# Patient Record
Sex: FEMALE | Race: WHITE | NOT HISPANIC OR LATINO
[De-identification: names, ages, dates, MRNs, and addresses within clinical notes are randomized per-mention and may not be internally consistent; named-entity substitution may affect disease eponyms.]

---

## 2020-06-25 ENCOUNTER — TRANSCRIPTION ENCOUNTER (OUTPATIENT)
Age: 30
End: 2020-06-25

## 2020-07-10 ENCOUNTER — TRANSCRIPTION ENCOUNTER (OUTPATIENT)
Age: 30
End: 2020-07-10

## 2020-08-23 ENCOUNTER — TRANSCRIPTION ENCOUNTER (OUTPATIENT)
Age: 30
End: 2020-08-23

## 2020-12-02 ENCOUNTER — TRANSCRIPTION ENCOUNTER (OUTPATIENT)
Age: 30
End: 2020-12-02

## 2020-12-17 ENCOUNTER — TRANSCRIPTION ENCOUNTER (OUTPATIENT)
Age: 30
End: 2020-12-17

## 2021-01-26 PROBLEM — Z00.00 ENCOUNTER FOR PREVENTIVE HEALTH EXAMINATION: Status: ACTIVE | Noted: 2021-01-26

## 2021-02-12 ENCOUNTER — APPOINTMENT (OUTPATIENT)
Dept: BREAST CENTER | Facility: CLINIC | Age: 31
End: 2021-02-12
Payer: COMMERCIAL

## 2021-02-12 VITALS
WEIGHT: 135 LBS | HEIGHT: 64 IN | SYSTOLIC BLOOD PRESSURE: 104 MMHG | HEART RATE: 63 BPM | BODY MASS INDEX: 23.05 KG/M2 | DIASTOLIC BLOOD PRESSURE: 72 MMHG

## 2021-02-12 DIAGNOSIS — F90.9 ATTENTION-DEFICIT HYPERACTIVITY DISORDER, UNSPECIFIED TYPE: ICD-10-CM

## 2021-02-12 DIAGNOSIS — Z86.59 PERSONAL HISTORY OF OTHER MENTAL AND BEHAVIORAL DISORDERS: ICD-10-CM

## 2021-02-12 DIAGNOSIS — Z78.9 OTHER SPECIFIED HEALTH STATUS: ICD-10-CM

## 2021-02-12 PROCEDURE — 99203 OFFICE O/P NEW LOW 30 MIN: CPT

## 2021-02-12 PROCEDURE — 99072 ADDL SUPL MATRL&STAF TM PHE: CPT

## 2021-02-12 RX ORDER — BUPROPION HYDROCHLORIDE 75 MG/1
TABLET, FILM COATED ORAL
Refills: 0 | Status: ACTIVE | COMMUNITY

## 2021-03-29 ENCOUNTER — NON-APPOINTMENT (OUTPATIENT)
Age: 31
End: 2021-03-29

## 2021-03-31 ENCOUNTER — TRANSCRIPTION ENCOUNTER (OUTPATIENT)
Age: 31
End: 2021-03-31

## 2021-08-11 ENCOUNTER — APPOINTMENT (OUTPATIENT)
Dept: BREAST CENTER | Facility: CLINIC | Age: 31
End: 2021-08-11

## 2021-08-17 ENCOUNTER — TRANSCRIPTION ENCOUNTER (OUTPATIENT)
Age: 31
End: 2021-08-17

## 2021-11-23 ENCOUNTER — TRANSCRIPTION ENCOUNTER (OUTPATIENT)
Age: 31
End: 2021-11-23

## 2022-03-03 ENCOUNTER — NON-APPOINTMENT (OUTPATIENT)
Age: 32
End: 2022-03-03

## 2022-03-10 ENCOUNTER — APPOINTMENT (OUTPATIENT)
Dept: BREAST CENTER | Facility: CLINIC | Age: 32
End: 2022-03-10
Payer: COMMERCIAL

## 2022-03-18 ENCOUNTER — APPOINTMENT (OUTPATIENT)
Dept: BREAST CENTER | Facility: CLINIC | Age: 32
End: 2022-03-18
Payer: COMMERCIAL

## 2022-03-18 VITALS
WEIGHT: 135 LBS | DIASTOLIC BLOOD PRESSURE: 72 MMHG | BODY MASS INDEX: 23.05 KG/M2 | HEART RATE: 80 BPM | HEIGHT: 64 IN | SYSTOLIC BLOOD PRESSURE: 112 MMHG

## 2022-03-18 PROCEDURE — 99212 OFFICE O/P EST SF 10 MIN: CPT

## 2022-07-20 ENCOUNTER — TRANSCRIPTION ENCOUNTER (OUTPATIENT)
Age: 32
End: 2022-07-20

## 2023-02-13 ENCOUNTER — NON-APPOINTMENT (OUTPATIENT)
Age: 33
End: 2023-02-13

## 2023-02-13 ENCOUNTER — APPOINTMENT (OUTPATIENT)
Dept: BREAST CENTER | Facility: CLINIC | Age: 33
End: 2023-02-13
Payer: COMMERCIAL

## 2023-02-13 VITALS
WEIGHT: 164 LBS | DIASTOLIC BLOOD PRESSURE: 73 MMHG | HEIGHT: 64 IN | SYSTOLIC BLOOD PRESSURE: 116 MMHG | BODY MASS INDEX: 28 KG/M2 | HEART RATE: 83 BPM

## 2023-02-13 DIAGNOSIS — G11.3 CEREBELLAR ATAXIA WITH DEFECTIVE DNA REPAIR: ICD-10-CM

## 2023-02-13 PROCEDURE — 99214 OFFICE O/P EST MOD 30 MIN: CPT

## 2023-02-13 RX ORDER — CETIRIZINE HCL 10 MG
TABLET ORAL
Refills: 0 | Status: DISCONTINUED | COMMUNITY
End: 2023-02-13

## 2023-02-13 NOTE — PHYSICAL EXAM
[de-identified] : Bilateral breast/axilla/supraclavicular area: No masses, discharge, or adenopathy\par

## 2023-02-13 NOTE — HISTORY OF PRESENT ILLNESS
[FreeTextEntry1] : Patient is a 33 yo  F here for breast cancer screening. 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 Family history of mother w/ CASSIE gene (no cancer) and maternal grandmother w/ breast cancer (age 35, no genetic testing). Patient denies palpable masses, skin changes, or nipple discharge bilaterally.\par \par 2/13/23-[facility]GAP\par \par 5/14/21: MRI- no suspicious enhancement b/l- BI RADS 1 \par 7/22/22 MRI: LIDIA, BIRADS 1

## 2023-02-13 NOTE — REVIEW OF SYSTEMS
[Fever] : no fever [Chills] : no chills [Shortness Of Breath] : no shortness of breath [Cough] : no cough [Skin Lesions] : no skin lesions [Skin Wound] : no skin wound [Difficulty Walking] : no difficulty walking

## 2023-06-02 ENCOUNTER — NON-APPOINTMENT (OUTPATIENT)
Age: 33
End: 2023-06-02

## 2023-06-02 ENCOUNTER — APPOINTMENT (OUTPATIENT)
Dept: OBGYN | Facility: CLINIC | Age: 33
End: 2023-06-02
Payer: COMMERCIAL

## 2023-06-02 VITALS
BODY MASS INDEX: 26.29 KG/M2 | SYSTOLIC BLOOD PRESSURE: 124 MMHG | DIASTOLIC BLOOD PRESSURE: 75 MMHG | HEIGHT: 64 IN | WEIGHT: 154 LBS | HEART RATE: 80 BPM

## 2023-06-02 DIAGNOSIS — Z31.41 ENCOUNTER FOR FERTILITY TESTING: ICD-10-CM

## 2023-06-02 DIAGNOSIS — Z31.69 ENCOUNTER FOR OTHER GENERAL COUNSELING AND ADVICE ON PROCREATION: ICD-10-CM

## 2023-06-02 DIAGNOSIS — Z11.3 ENCOUNTER FOR SCREENING FOR INFECTIONS WITH A PREDOMINANTLY SEXUAL MODE OF TRANSMISSION: ICD-10-CM

## 2023-06-02 DIAGNOSIS — Z12.39 ENCOUNTER FOR OTHER SCREENING FOR MALIGNANT NEOPLASM OF BREAST: ICD-10-CM

## 2023-06-02 DIAGNOSIS — Z01.419 ENCOUNTER FOR GYNECOLOGICAL EXAMINATION (GENERAL) (ROUTINE) W/OUT ABNORMAL FINDINGS: ICD-10-CM

## 2023-06-02 DIAGNOSIS — Z12.4 ENCOUNTER FOR SCREENING FOR MALIGNANT NEOPLASM OF CERVIX: ICD-10-CM

## 2023-06-02 PROCEDURE — 99385 PREV VISIT NEW AGE 18-39: CPT

## 2023-06-02 PROCEDURE — 36415 COLL VENOUS BLD VENIPUNCTURE: CPT

## 2023-06-03 ENCOUNTER — TRANSCRIPTION ENCOUNTER (OUTPATIENT)
Age: 33
End: 2023-06-03

## 2023-06-03 LAB
25(OH)D3 SERPL-MCNC: 20.3 NG/ML
ALBUMIN SERPL ELPH-MCNC: 4.9 G/DL
ALP BLD-CCNC: 66 U/L
ALT SERPL-CCNC: 14 U/L
ANION GAP SERPL CALC-SCNC: 13 MMOL/L
ANTI-MUELLERIAN HORMONE: 5.91 NG/ML
AST SERPL-CCNC: 22 U/L
BILIRUB SERPL-MCNC: 0.9 MG/DL
BUN SERPL-MCNC: 20 MG/DL
C TRACH RRNA SPEC QL NAA+PROBE: NOT DETECTED
CALCIUM SERPL-MCNC: 9.5 MG/DL
CHLORIDE SERPL-SCNC: 104 MMOL/L
CHOLEST SERPL-MCNC: 135 MG/DL
CO2 SERPL-SCNC: 28 MMOL/L
CREAT SERPL-MCNC: 0.8 MG/DL
EGFR: 101 ML/MIN/1.73M2
ESTIMATED AVERAGE GLUCOSE: 97 MG/DL
GLUCOSE SERPL-MCNC: 90 MG/DL
HBA1C MFR BLD HPLC: 5 %
HBV SURFACE AG SER QL: NONREACTIVE
HCV AB SER QL: NONREACTIVE
HCV S/CO RATIO: 0.11 S/CO
HDLC SERPL-MCNC: 83 MG/DL
HIV1+2 AB SPEC QL IA.RAPID: NONREACTIVE
HPV HIGH+LOW RISK DNA PNL CVX: NOT DETECTED
LDLC SERPL CALC-MCNC: 47 MG/DL
N GONORRHOEA RRNA SPEC QL NAA+PROBE: NOT DETECTED
NONHDLC SERPL-MCNC: 52 MG/DL
POTASSIUM SERPL-SCNC: 4.4 MMOL/L
PROT SERPL-MCNC: 7.1 G/DL
SODIUM SERPL-SCNC: 145 MMOL/L
SOURCE TP AMPLIFICATION: NORMAL
T PALLIDUM AB SER QL IA: NEGATIVE
TRIGL SERPL-MCNC: 28 MG/DL
TSH SERPL-ACNC: 2.1 UIU/ML
VIT B12 SERPL-MCNC: 570 PG/ML

## 2023-06-03 NOTE — PLAN
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 Ananda Gonzalez.  She has f/u appt scheduled 9/2023 and will discuss further what she has been told about having baseline mammogram prior to pregnancy.\par Pap smear and HPV testing done\par GC/CT cxs via pap done\par STD blood work panel drawn\par Annual screening blood work drawn \par Breast exam done and WNL\par Age-related screening tests and timing discussed.\par

## 2023-06-03 NOTE — HISTORY OF PRESENT ILLNESS
[IUD] : has an intrauterine device [Y] : Patient is sexually active [FreeTextEntry1] : 32 y/o G0 here to re-establish GYN care \par Pt in prior practice.\par Here today for annual exam and for preconception counseling.\par Wants to try for pregnancy early  \par Has Mirena IUD that was placed 2018\par ?? PCOS diagnosis\par \par \par \par \par ------------------------------------------------------------------------------------------------------------------------------------------\par PMH: ADHD\par \par PSH: Denies \par \par OB Hx: G0, never TTC\par \par GYN Hx: \par Last pap 1 year ago, normal per pt, h/o HPV for a few years, no colposcopy\par Menarche @ 14 y/o, some irregular cycles, last 5 days\par No STDs other than HPV\par Denies h/o uterine fibroids \par Has h/o ovarian cyst, followed and resolved, no surgery\par ??? h/o PCOS\par Has 2018, Mirena IUD\par \par Family Hx:\par Mat GM - breast cancer, dx 34 y/o\par Mother has ATN gene - no cancer\par Pt has ATN gene - gets screening \par Father - angiosarcoma  after 3 months of diagnosis, dx 64 y/o\par \par Meds: Wellbutrin 300 mg \par \par Allergies: NKDA\par \par Social Hx:\par Never a smoker\par Social alcohol use\par No drug use\par  - LISA GIVENS - 30 y/o, healthy, no children \par Been  1 year, been together 6 years, met through mutual friends\par Works - in executive search for [employer] \par \par  [PapSmeardate] : 06/01/20 [LMPDate] : 02/01/2018

## 2023-06-03 NOTE — COUNSELING
[Vitamins/Supplements] : vitamins/supplements [Drugs/Alcohol] : drugs, alcohol [Breast Self Exam] : breast self exam [Contraception/ Emergency Contraception/ Safe Sexual Practices] : contraception, emergency contraception, safe sexual practices [Preconception Care/ Fertility options] : preconception care, fertility options [Confidentiality] : confidentiality [STD (testing, results, tx)] : STD (testing, results, tx) [Vaccines] : vaccines

## 2023-06-03 NOTE — PHYSICAL EXAM
[Chaperone Present] : A chaperone was present in the examining room during all aspects of the physical examination [Appropriately responsive] : appropriately responsive [Alert] : alert [No Acute Distress] : no acute distress [No Lymphadenopathy] : no lymphadenopathy [Soft] : soft [Non-tender] : non-tender [Non-distended] : non-distended [No HSM] : No HSM [No Lesions] : no lesions [No Mass] : no mass [Oriented x3] : oriented x3 [Examination Of The Breasts] : a normal appearance [No Masses] : no breast masses were palpable [Labia Majora] : normal [Labia Minora] : normal [Normal] : normal [Uterine Adnexae] : normal [FreeTextEntry5] : No increased work in breathing

## 2023-06-10 LAB — CYTOLOGY CVX/VAG DOC THIN PREP: NORMAL

## 2023-07-11 ENCOUNTER — APPOINTMENT (OUTPATIENT)
Dept: INTERNAL MEDICINE | Facility: CLINIC | Age: 33
End: 2023-07-11

## 2023-08-16 ENCOUNTER — APPOINTMENT (OUTPATIENT)
Dept: INTERNAL MEDICINE | Facility: CLINIC | Age: 33
End: 2023-08-16

## 2023-09-05 ENCOUNTER — NON-APPOINTMENT (OUTPATIENT)
Age: 33
End: 2023-09-05

## 2023-09-06 ENCOUNTER — APPOINTMENT (OUTPATIENT)
Dept: BREAST CENTER | Facility: CLINIC | Age: 33
End: 2023-09-06
Payer: COMMERCIAL

## 2023-09-06 VITALS
HEIGHT: 64 IN | BODY MASS INDEX: 27.66 KG/M2 | SYSTOLIC BLOOD PRESSURE: 104 MMHG | DIASTOLIC BLOOD PRESSURE: 72 MMHG | WEIGHT: 162 LBS | HEART RATE: 69 BPM

## 2023-09-06 DIAGNOSIS — Z80.3 FAMILY HISTORY OF MALIGNANT NEOPLASM OF BREAST: ICD-10-CM

## 2023-09-06 DIAGNOSIS — Z91.89 OTHER SPECIFIED PERSONAL RISK FACTORS, NOT ELSEWHERE CLASSIFIED: ICD-10-CM

## 2023-09-06 PROCEDURE — 99214 OFFICE O/P EST MOD 30 MIN: CPT

## 2023-09-06 NOTE — PHYSICAL EXAM
[de-identified] : Bilateral breast/axilla/supraclavicular area: No masses, discharge, or adenopathy\par

## 2023-09-06 NOTE — HISTORY OF PRESENT ILLNESS
[FreeTextEntry1] : Patient is a 34 yo  F here for breast cancer screening. 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 Family history of mother w/ CASSIE gene (no cancer) and maternal grandmother w/ breast cancer (age 35, no genetic testing). Patient denies palpable masses, skin changes, or nipple discharge bilaterally.   2/13/23: [facility] Registry  5/14/21: MRI- no suspicious enhancement b/l- BI RADS 1  7/22/22: MRI- LIDIA, BIRADS 1 8/31/23: MRI- No MR evidence of malignancy. BI-RADS 1

## 2023-10-04 ENCOUNTER — APPOINTMENT (OUTPATIENT)
Dept: OBGYN | Facility: CLINIC | Age: 33
End: 2023-10-04
Payer: COMMERCIAL

## 2023-10-04 VITALS
SYSTOLIC BLOOD PRESSURE: 125 MMHG | WEIGHT: 155 LBS | DIASTOLIC BLOOD PRESSURE: 80 MMHG | HEIGHT: 64 IN | HEART RATE: 71 BPM | OXYGEN SATURATION: 98 % | BODY MASS INDEX: 26.46 KG/M2

## 2023-10-04 DIAGNOSIS — Z30.432 ENCOUNTER FOR REMOVAL OF INTRAUTERINE CONTRACEPTIVE DEVICE: ICD-10-CM

## 2023-10-04 DIAGNOSIS — Z31.9 ENCOUNTER FOR PROCREATIVE MANAGEMENT, UNSPECIFIED: ICD-10-CM

## 2023-10-04 PROCEDURE — 58301 REMOVE INTRAUTERINE DEVICE: CPT

## 2023-10-05 ENCOUNTER — TRANSCRIPTION ENCOUNTER (OUTPATIENT)
Age: 33
End: 2023-10-05

## 2023-10-25 ENCOUNTER — TRANSCRIPTION ENCOUNTER (OUTPATIENT)
Age: 33
End: 2023-10-25

## 2023-10-26 ENCOUNTER — TRANSCRIPTION ENCOUNTER (OUTPATIENT)
Age: 33
End: 2023-10-26

## 2023-11-13 PROBLEM — Z30.432 ENCOUNTER FOR REMOVAL OF INTRAUTERINE CONTRACEPTIVE DEVICE (IUD): Status: ACTIVE | Noted: 2023-11-13

## 2023-11-13 PROBLEM — Z31.9 PATIENT DESIRES PREGNANCY: Status: ACTIVE | Noted: 2023-11-13

## 2024-05-16 ENCOUNTER — TRANSCRIPTION ENCOUNTER (OUTPATIENT)
Age: 34
End: 2024-05-16

## 2024-05-16 DIAGNOSIS — Z15.01 GENETIC SUSCEPTIBILITY TO MALIGNANT NEOPLASM OF BREAST: ICD-10-CM

## 2024-05-16 DIAGNOSIS — Z80.3 FAMILY HISTORY OF MALIGNANT NEOPLASM OF BREAST: ICD-10-CM

## 2024-05-16 DIAGNOSIS — Z15.09 GENETIC SUSCEPTIBILITY TO MALIGNANT NEOPLASM OF BREAST: ICD-10-CM

## 2024-05-21 ENCOUNTER — TRANSCRIPTION ENCOUNTER (OUTPATIENT)
Age: 34
End: 2024-05-21

## 2024-09-11 ENCOUNTER — NON-APPOINTMENT (OUTPATIENT)
Age: 34
End: 2024-09-11

## 2024-09-11 ENCOUNTER — TRANSCRIPTION ENCOUNTER (OUTPATIENT)
Age: 34
End: 2024-09-11

## 2024-09-16 ENCOUNTER — APPOINTMENT (OUTPATIENT)
Dept: BREAST CENTER | Facility: CLINIC | Age: 34
End: 2024-09-16
Payer: COMMERCIAL

## 2024-09-16 VITALS — HEIGHT: 64 IN | SYSTOLIC BLOOD PRESSURE: 119 MMHG | HEART RATE: 64 BPM | DIASTOLIC BLOOD PRESSURE: 81 MMHG

## 2024-09-16 DIAGNOSIS — Z15.09 GENETIC SUSCEPTIBILITY TO MALIGNANT NEOPLASM OF BREAST: ICD-10-CM

## 2024-09-16 DIAGNOSIS — R92.30 DENSE BREASTS, UNSPECIFIED: ICD-10-CM

## 2024-09-16 DIAGNOSIS — Z15.01 GENETIC SUSCEPTIBILITY TO MALIGNANT NEOPLASM OF BREAST: ICD-10-CM

## 2024-09-16 DIAGNOSIS — Z80.3 FAMILY HISTORY OF MALIGNANT NEOPLASM OF BREAST: ICD-10-CM

## 2024-09-16 DIAGNOSIS — Z91.89 OTHER SPECIFIED PERSONAL RISK FACTORS, NOT ELSEWHERE CLASSIFIED: ICD-10-CM

## 2024-09-16 PROCEDURE — 99213 OFFICE O/P EST LOW 20 MIN: CPT

## 2024-09-16 RX ORDER — LISDEXAMFETAMINE DIMESYLATE 10 MG/1
CAPSULE ORAL
Refills: 0 | Status: ACTIVE | COMMUNITY

## 2024-09-16 NOTE — PAST MEDICAL HISTORY
[Menarche Age ____] : age at menarche was [unfilled] [Definite ___ (Date)] : the last menstrual period was [unfilled] [Total Preg ___] : G[unfilled] [FreeTextEntry5] : no [FreeTextEntry6] : no [FreeTextEntry7] : Mirena iud  [FreeTextEntry8] : no

## 2024-09-16 NOTE — PHYSICAL EXAM
[Normocephalic] : normocephalic [EOMI] : extra ocular movement intact [Supple] : supple [No Supraclavicular Adenopathy] : no supraclavicular adenopathy [No Cervical Adenopathy] : no cervical adenopathy [de-identified] : Bilateral breast/axilla/supraclavicular area: No masses, discharge, or adenopathy\par

## 2024-09-16 NOTE — HISTORY OF PRESENT ILLNESS
[FreeTextEntry1] : Patient is a 33 yo  F here for breast cancer screening. 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 Family history of mother w/ CASSIE gene (no cancer) and maternal grandmother w/ breast cancer (age 35, no genetic testing). Patient denies palpable masses, skin changes, or nipple discharge bilaterally.  Of note, patient states she is trying to get pregnant.   2/13/23: [facility] Registry  5/14/21: MRI- no suspicious enhancement b/l- BI RADS 1  7/22/22: MRI- LIDIA, BIRADS 1 8/31/23: MRI- No MR evidence of malignancy. BI-RADS 1 5/24/24: B/l MG(baseline): Heterogeneously dense.  LIDIA.  BI-RADS 1. 9/16/24: MRI-heterogeneously dense.  LIDIA.  BI-RADS 2. 9/16/24: B/l US(baseline)-no solid or complex cystic mass bilaterally.  No lymphadenopathy bilaterally.  BI-RADS 2.

## 2024-09-16 NOTE — PHYSICAL EXAM
[Normocephalic] : normocephalic [EOMI] : extra ocular movement intact [Supple] : supple [No Supraclavicular Adenopathy] : no supraclavicular adenopathy [No Cervical Adenopathy] : no cervical adenopathy [de-identified] : Bilateral breast/axilla/supraclavicular area: No masses, discharge, or adenopathy\par

## 2024-09-16 NOTE — PHYSICAL EXAM
[Normocephalic] : normocephalic [EOMI] : extra ocular movement intact [Supple] : supple [No Supraclavicular Adenopathy] : no supraclavicular adenopathy [No Cervical Adenopathy] : no cervical adenopathy [de-identified] : Bilateral breast/axilla/supraclavicular area: No masses, discharge, or adenopathy\par

## 2024-09-16 NOTE — PHYSICAL EXAM
[Normocephalic] : normocephalic [EOMI] : extra ocular movement intact [Supple] : supple [No Supraclavicular Adenopathy] : no supraclavicular adenopathy [No Cervical Adenopathy] : no cervical adenopathy [de-identified] : Bilateral breast/axilla/supraclavicular area: No masses, discharge, or adenopathy\par

## 2024-09-16 NOTE — HISTORY OF PRESENT ILLNESS
[FreeTextEntry1] : Patient is a 35 yo  F here for breast cancer screening. 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 Family history of mother w/ CASSIE gene (no cancer) and maternal grandmother w/ breast cancer (age 35, no genetic testing). Patient denies palpable masses, skin changes, or nipple discharge bilaterally.  Of note, patient states she is trying to get pregnant.   2/13/23: [facility] Registry  5/14/21: MRI- no suspicious enhancement b/l- BI RADS 1  7/22/22: MRI- LIDIA, BIRADS 1 8/31/23: MRI- No MR evidence of malignancy. BI-RADS 1 5/24/24: B/l MG(baseline): Heterogeneously dense.  LIDIA.  BI-RADS 1. 9/16/24: MRI-heterogeneously dense.  LIDIA.  BI-RADS 2. 9/16/24: B/l US(baseline)-no solid or complex cystic mass bilaterally.  No lymphadenopathy bilaterally.  BI-RADS 2.

## 2024-10-11 ENCOUNTER — NON-APPOINTMENT (OUTPATIENT)
Age: 34
End: 2024-10-11

## 2024-10-11 ENCOUNTER — APPOINTMENT (OUTPATIENT)
Dept: OBGYN | Facility: CLINIC | Age: 34
End: 2024-10-11

## 2024-10-11 VITALS
WEIGHT: 156 LBS | OXYGEN SATURATION: 97 % | BODY MASS INDEX: 26.78 KG/M2 | SYSTOLIC BLOOD PRESSURE: 118 MMHG | HEART RATE: 77 BPM | DIASTOLIC BLOOD PRESSURE: 79 MMHG

## 2024-10-11 DIAGNOSIS — Z31.69 ENCOUNTER FOR OTHER GENERAL COUNSELING AND ADVICE ON PROCREATION: ICD-10-CM

## 2024-10-11 DIAGNOSIS — Z12.4 ENCOUNTER FOR SCREENING FOR MALIGNANT NEOPLASM OF CERVIX: ICD-10-CM

## 2024-10-11 DIAGNOSIS — Z01.419 ENCOUNTER FOR GYNECOLOGICAL EXAMINATION (GENERAL) (ROUTINE) W/OUT ABNORMAL FINDINGS: ICD-10-CM

## 2024-10-11 DIAGNOSIS — Z12.39 ENCOUNTER FOR OTHER SCREENING FOR MALIGNANT NEOPLASM OF BREAST: ICD-10-CM

## 2024-10-11 DIAGNOSIS — Z11.3 ENCOUNTER FOR SCREENING FOR INFECTIONS WITH A PREDOMINANTLY SEXUAL MODE OF TRANSMISSION: ICD-10-CM

## 2024-10-11 PROCEDURE — 99395 PREV VISIT EST AGE 18-39: CPT

## 2024-10-14 LAB
C TRACH RRNA SPEC QL NAA+PROBE: NOT DETECTED
HPV HIGH+LOW RISK DNA PNL CVX: NOT DETECTED
N GONORRHOEA RRNA SPEC QL NAA+PROBE: NOT DETECTED
SOURCE TP AMPLIFICATION: NORMAL

## 2024-10-17 LAB — CYTOLOGY CVX/VAG DOC THIN PREP: NORMAL

## 2024-10-21 ENCOUNTER — TRANSCRIPTION ENCOUNTER (OUTPATIENT)
Age: 34
End: 2024-10-21

## 2024-11-01 ENCOUNTER — TRANSCRIPTION ENCOUNTER (OUTPATIENT)
Age: 34
End: 2024-11-01

## 2024-11-05 ENCOUNTER — APPOINTMENT (OUTPATIENT)
Dept: OBGYN | Facility: CLINIC | Age: 34
End: 2024-11-05

## 2024-11-05 ENCOUNTER — LABORATORY RESULT (OUTPATIENT)
Age: 34
End: 2024-11-05

## 2024-11-05 VITALS
WEIGHT: 158 LBS | OXYGEN SATURATION: 98 % | HEART RATE: 99 BPM | BODY MASS INDEX: 27.12 KG/M2 | SYSTOLIC BLOOD PRESSURE: 100 MMHG | DIASTOLIC BLOOD PRESSURE: 65 MMHG

## 2024-11-05 DIAGNOSIS — N91.1 SECONDARY AMENORRHEA: ICD-10-CM

## 2024-11-05 DIAGNOSIS — Z32.00 ENCOUNTER FOR PREGNANCY TEST, RESULT UNKNOWN: ICD-10-CM

## 2024-11-05 PROCEDURE — 36415 COLL VENOUS BLD VENIPUNCTURE: CPT

## 2024-11-05 PROCEDURE — 81025 URINE PREGNANCY TEST: CPT

## 2024-11-05 PROCEDURE — 76830 TRANSVAGINAL US NON-OB: CPT

## 2024-11-05 PROCEDURE — 99214 OFFICE O/P EST MOD 30 MIN: CPT | Mod: 25

## 2024-11-06 LAB
ABO + RH PNL BLD: NORMAL
ALBUMIN SERPL ELPH-MCNC: 4.6 G/DL
ALP BLD-CCNC: 49 U/L
ALT SERPL-CCNC: 24 U/L
ANION GAP SERPL CALC-SCNC: 13 MMOL/L
AST SERPL-CCNC: 39 U/L
B19V IGG SER QL IA: 0.22 INDEX
B19V IGG+IGM SER-IMP: NEGATIVE
B19V IGG+IGM SER-IMP: NORMAL
B19V IGM FLD-ACNC: 0.26 INDEX
B19V IGM SER-ACNC: NEGATIVE
BILIRUB SERPL-MCNC: 0.8 MG/DL
BLD GP AB SCN SERPL QL: NORMAL
BUN SERPL-MCNC: 19 MG/DL
C TRACH RRNA SPEC QL NAA+PROBE: NOT DETECTED
CALCIUM SERPL-MCNC: 9.4 MG/DL
CHLORIDE SERPL-SCNC: 103 MMOL/L
CMV IGG SERPL QL: <0.2 U/ML
CMV IGG SERPL-IMP: NEGATIVE
CMV IGM SERPL QL: <8 AU/ML
CMV IGM SERPL QL: NEGATIVE
CO2 SERPL-SCNC: 25 MMOL/L
CREAT SERPL-MCNC: 0.67 MG/DL
EGFR: 119 ML/MIN/1.73M2
ESTIMATED AVERAGE GLUCOSE: 97 MG/DL
GLUCOSE SERPL-MCNC: 94 MG/DL
HBA1C MFR BLD HPLC: 5 %
HBV SURFACE AG SER QL: NONREACTIVE
HCT VFR BLD CALC: 40.6 %
HCV AB SER QL: NONREACTIVE
HCV RNA SERPL NAA+PROBE-LOG IU: NOT DETECTED LOGIU/ML
HCV S/CO RATIO: 0.09 S/CO
HEPC RNA INTERP: NOT DETECTED
HGB A MFR BLD: 97.5 %
HGB A2 MFR BLD: 2.5 %
HGB BLD-MCNC: 13.3 G/DL
HGB FRACT BLD-IMP: NORMAL
HIV1+2 AB SPEC QL IA.RAPID: NONREACTIVE
MCHC RBC-ENTMCNC: 32.5 PG
MCHC RBC-ENTMCNC: 32.8 G/DL
MCV RBC AUTO: 99.3 FL
MEV IGG FLD QL IA: >300 AU/ML
MEV IGG+IGM SER-IMP: POSITIVE
MUV AB SER-ACNC: POSITIVE
MUV IGG SER QL IA: 105 AU/ML
N GONORRHOEA RRNA SPEC QL NAA+PROBE: NOT DETECTED
PLATELET # BLD AUTO: 311 K/UL
POTASSIUM SERPL-SCNC: 4.7 MMOL/L
PROT SERPL-MCNC: 7 G/DL
RBC # BLD: 4.09 M/UL
RBC # FLD: 13.2 %
RUBV IGG FLD-ACNC: 2.17 INDEX
RUBV IGG SER-IMP: POSITIVE
SODIUM SERPL-SCNC: 141 MMOL/L
SOURCE AMPLIFICATION: NORMAL
T GONDII AB SER-IMP: NEGATIVE
T GONDII AB SER-IMP: NEGATIVE
T GONDII IGG SER QL: <3 IU/ML
T GONDII IGM SER QL: <3 AU/ML
T PALLIDUM AB SER QL IA: NEGATIVE
TSH SERPL-ACNC: 3.07 UIU/ML
VZV AB TITR SER: POSITIVE
VZV IGG SER IF-ACNC: 4.56 S/CO
WBC # FLD AUTO: 5.36 K/UL

## 2024-11-07 LAB — BACTERIA UR CULT: NORMAL

## 2024-11-19 ENCOUNTER — APPOINTMENT (OUTPATIENT)
Dept: OBGYN | Facility: CLINIC | Age: 34
End: 2024-11-19

## 2024-11-19 VITALS
OXYGEN SATURATION: 99 % | WEIGHT: 159 LBS | DIASTOLIC BLOOD PRESSURE: 66 MMHG | BODY MASS INDEX: 27.14 KG/M2 | HEART RATE: 79 BPM | SYSTOLIC BLOOD PRESSURE: 108 MMHG | HEIGHT: 64 IN

## 2024-11-19 PROCEDURE — 81003 URINALYSIS AUTO W/O SCOPE: CPT | Mod: QW

## 2024-11-19 PROCEDURE — 0500F INITIAL PRENATAL CARE VISIT: CPT

## 2024-11-19 PROCEDURE — 76830 TRANSVAGINAL US NON-OB: CPT

## 2024-12-02 ENCOUNTER — TRANSCRIPTION ENCOUNTER (OUTPATIENT)
Age: 34
End: 2024-12-02

## 2024-12-03 ENCOUNTER — NON-APPOINTMENT (OUTPATIENT)
Age: 34
End: 2024-12-03

## 2024-12-06 ENCOUNTER — APPOINTMENT (OUTPATIENT)
Dept: OBGYN | Facility: CLINIC | Age: 34
End: 2024-12-06

## 2024-12-06 PROCEDURE — 0500F INITIAL PRENATAL CARE VISIT: CPT

## 2024-12-06 PROCEDURE — 81003 URINALYSIS AUTO W/O SCOPE: CPT | Mod: NC,QW

## 2024-12-06 PROCEDURE — 36415 COLL VENOUS BLD VENIPUNCTURE: CPT

## 2024-12-18 ENCOUNTER — ASOB RESULT (OUTPATIENT)
Age: 34
End: 2024-12-18

## 2024-12-18 ENCOUNTER — APPOINTMENT (OUTPATIENT)
Dept: ANTEPARTUM | Facility: CLINIC | Age: 34
End: 2024-12-18
Payer: COMMERCIAL

## 2024-12-18 PROCEDURE — 76813 OB US NUCHAL MEAS 1 GEST: CPT

## 2024-12-18 PROCEDURE — 93976 VASCULAR STUDY: CPT

## 2024-12-18 PROCEDURE — 76801 OB US < 14 WKS SINGLE FETUS: CPT | Mod: 59

## 2025-01-03 ENCOUNTER — TRANSCRIPTION ENCOUNTER (OUTPATIENT)
Age: 35
End: 2025-01-03

## 2025-01-03 PROBLEM — Z13.79 GENETIC TESTING: Status: ACTIVE | Noted: 2025-01-03

## 2025-01-03 PROBLEM — Z34.91 FIRST TRIMESTER PREGNANCY: Status: ACTIVE | Noted: 2025-01-03

## 2025-01-13 ENCOUNTER — APPOINTMENT (OUTPATIENT)
Dept: OBGYN | Facility: CLINIC | Age: 35
End: 2025-01-13
Payer: COMMERCIAL

## 2025-01-13 PROCEDURE — 0502F SUBSEQUENT PRENATAL CARE: CPT

## 2025-01-27 ENCOUNTER — NON-APPOINTMENT (OUTPATIENT)
Age: 35
End: 2025-01-27

## 2025-01-27 ENCOUNTER — APPOINTMENT (OUTPATIENT)
Dept: OBGYN | Facility: CLINIC | Age: 35
End: 2025-01-27
Payer: COMMERCIAL

## 2025-01-27 VITALS
SYSTOLIC BLOOD PRESSURE: 119 MMHG | HEART RATE: 77 BPM | WEIGHT: 169 LBS | OXYGEN SATURATION: 98 % | BODY MASS INDEX: 29.01 KG/M2 | DIASTOLIC BLOOD PRESSURE: 79 MMHG

## 2025-01-27 DIAGNOSIS — Z34.90 ENCOUNTER FOR SUPERVISION OF NORMAL PREGNANCY, UNSPECIFIED, UNSPECIFIED TRIMESTER: ICD-10-CM

## 2025-01-27 PROCEDURE — 0502F SUBSEQUENT PRENATAL CARE: CPT

## 2025-01-28 LAB
AFP INTERP SERPL-IMP: NORMAL
AFP INTERP SERPL-IMP: NORMAL
AFP MOM CUT-OFF: 2.5
AFP MOM SERPL: 0.66
AFP PERCENTILE: 9.5
AFP SERPL-ACNC: 26.06 NG/ML
CARBAMAZEPINE?: NO
CURRENT SMOKER: NORMAL
DIABETES STATUS PATIENT: NORMAL
GA: NORMAL
GESTATIONAL AGE METHOD: NORMAL
HX OF NTD NARR: NORMAL
MULTIPLE PREGNANCY: NORMAL
NEURAL TUBE DEFECT RISK FETUS: NORMAL
NEURAL TUBE DEFECT RISK POP: NORMAL
RECOM F/U: NO
TEST PERFORMANCE INFO SPEC: NORMAL
VALPROIC ACID?: NORMAL

## 2025-02-11 ENCOUNTER — APPOINTMENT (OUTPATIENT)
Dept: OBGYN | Facility: CLINIC | Age: 35
End: 2025-02-11
Payer: COMMERCIAL

## 2025-02-11 VITALS
SYSTOLIC BLOOD PRESSURE: 115 MMHG | WEIGHT: 177 LBS | BODY MASS INDEX: 30.38 KG/M2 | DIASTOLIC BLOOD PRESSURE: 72 MMHG | HEART RATE: 83 BPM | OXYGEN SATURATION: 98 %

## 2025-02-11 PROCEDURE — 0502F SUBSEQUENT PRENATAL CARE: CPT

## 2025-02-14 ENCOUNTER — TRANSCRIPTION ENCOUNTER (OUTPATIENT)
Age: 35
End: 2025-02-14

## 2025-02-25 ENCOUNTER — APPOINTMENT (OUTPATIENT)
Dept: ANTEPARTUM | Facility: CLINIC | Age: 35
End: 2025-02-25
Payer: COMMERCIAL

## 2025-02-25 ENCOUNTER — ASOB RESULT (OUTPATIENT)
Age: 35
End: 2025-02-25

## 2025-02-25 PROCEDURE — 76811 OB US DETAILED SNGL FETUS: CPT

## 2025-02-25 PROCEDURE — 76817 TRANSVAGINAL US OBSTETRIC: CPT

## 2025-03-17 ENCOUNTER — APPOINTMENT (OUTPATIENT)
Dept: OBGYN | Facility: CLINIC | Age: 35
End: 2025-03-17
Payer: COMMERCIAL

## 2025-03-17 VITALS
SYSTOLIC BLOOD PRESSURE: 115 MMHG | HEIGHT: 64 IN | HEART RATE: 81 BPM | OXYGEN SATURATION: 98 % | BODY MASS INDEX: 30.39 KG/M2 | DIASTOLIC BLOOD PRESSURE: 73 MMHG | WEIGHT: 178 LBS

## 2025-03-17 DIAGNOSIS — Z34.02 ENCOUNTER FOR SUPERVISION OF NORMAL FIRST PREGNANCY, SECOND TRIMESTER: ICD-10-CM

## 2025-03-17 DIAGNOSIS — Z13.1 ENCOUNTER FOR SCREENING FOR DIABETES MELLITUS: ICD-10-CM

## 2025-03-17 PROCEDURE — 0502F SUBSEQUENT PRENATAL CARE: CPT

## 2025-03-19 ENCOUNTER — TRANSCRIPTION ENCOUNTER (OUTPATIENT)
Age: 35
End: 2025-03-19

## 2025-03-19 LAB
GLUCOSE 1H P 50 G GLC PO SERPL-MCNC: 124 MG/DL
HCT VFR BLD CALC: 37.5 %
HGB BLD-MCNC: 12.3 G/DL
MCHC RBC-ENTMCNC: 32.7 PG
MCHC RBC-ENTMCNC: 32.8 G/DL
MCV RBC AUTO: 99.7 FL
PLATELET # BLD AUTO: 278 K/UL
RBC # BLD: 3.76 M/UL
RBC # FLD: 13.8 %
T PALLIDUM AB SER QL IA: NEGATIVE
WBC # FLD AUTO: 10 K/UL

## 2025-03-21 ENCOUNTER — TRANSCRIPTION ENCOUNTER (OUTPATIENT)
Age: 35
End: 2025-03-21

## 2025-03-21 ENCOUNTER — NON-APPOINTMENT (OUTPATIENT)
Age: 35
End: 2025-03-21

## 2025-04-06 ENCOUNTER — OUTPATIENT (OUTPATIENT)
Dept: OUTPATIENT SERVICES | Facility: HOSPITAL | Age: 35
LOS: 1 days | End: 2025-04-06
Payer: COMMERCIAL

## 2025-04-06 VITALS
SYSTOLIC BLOOD PRESSURE: 111 MMHG | HEART RATE: 80 BPM | OXYGEN SATURATION: 98 % | TEMPERATURE: 98 F | DIASTOLIC BLOOD PRESSURE: 65 MMHG | RESPIRATION RATE: 18 BRPM

## 2025-04-06 DIAGNOSIS — O26.899 OTHER SPECIFIED PREGNANCY RELATED CONDITIONS, UNSPECIFIED TRIMESTER: ICD-10-CM

## 2025-04-06 LAB
APPEARANCE UR: CLEAR — SIGNIFICANT CHANGE UP
BILIRUB UR-MCNC: NEGATIVE — SIGNIFICANT CHANGE UP
COLOR SPEC: YELLOW — SIGNIFICANT CHANGE UP
DIFF PNL FLD: NEGATIVE — SIGNIFICANT CHANGE UP
GLUCOSE UR QL: NEGATIVE MG/DL — SIGNIFICANT CHANGE UP
KETONES UR-MCNC: NEGATIVE MG/DL — SIGNIFICANT CHANGE UP
LEUKOCYTE ESTERASE UR-ACNC: NEGATIVE — SIGNIFICANT CHANGE UP
NITRITE UR-MCNC: NEGATIVE — SIGNIFICANT CHANGE UP
PH UR: 7.5 — SIGNIFICANT CHANGE UP (ref 5–8)
PROT UR-MCNC: NEGATIVE MG/DL — SIGNIFICANT CHANGE UP
SP GR SPEC: 1.01 — SIGNIFICANT CHANGE UP (ref 1–1.03)
UROBILINOGEN FLD QL: 1 MG/DL — SIGNIFICANT CHANGE UP (ref 0.2–1)

## 2025-04-06 PROCEDURE — 99214 OFFICE O/P EST MOD 30 MIN: CPT

## 2025-04-06 PROCEDURE — 87086 URINE CULTURE/COLONY COUNT: CPT

## 2025-04-06 PROCEDURE — 81003 URINALYSIS AUTO W/O SCOPE: CPT

## 2025-04-06 NOTE — OB PROVIDER TRIAGE NOTE - HISTORY OF PRESENT ILLNESS
Patient is a  34y      She denies toxic complaints such as headache, scotoma, CP, SOB, RUQ/epigastric pain. - LOF - VB - CTX +FM    Ante: Spontaneous pregnancy. NIPT and anatomy scan wnl. Passed GCT. Denies elevated BP in pregnancy.  EFW   GBS     OBHX:  G1 -   GynHX: denies fibroids, ovarian cysts, abnormal pap smear, STI/herpes.   MedHX: denies  Surghx: denies  Medications: denies  Allergies: NKDA    Physical Exam:  T(C): 36.5 (04-06-25 @ 15:06), Max: 36.5 (04-06-25 @ 15:06)  HR: 80 (04-06-25 @ 15:06) (80 - 80)  BP: 111/65 (04-06-25 @ 15:06) (111/65 - 111/65)  RR: 18 (04-06-25 @ 15:06) (18 - 18)  SpO2: 98% (04-06-25 @ 15:06) (98% - 98%)    General: NAD  Pulm: no increased WOB  Abdomen: soft, gravid, nontender  Extremities: wnl   TAUS: Cephalic. BPP 8/8. LUCI [ ]. Placenta [ ].   VE:     EFM: [ ] bpm, mod variability, + accels, - decels; reactive and reassuring  [place]: no ctx        A/p:   34y    - Fetal status is reassuring given BPP 8/8, appropriate LUCI, reactive and reassuring NST  - Not in labor given no CTX on monitor, no pain, cervical exam of [ ]   - Discharged with strict return precautions    Brianda Layne, PGY1  Discussed with  Patient is a  34y  at 27w4d presenting after a fall onto her behind yesterday  at 1830. She was sitting down onto a chair and missed the chair, fell onto the floor on her arms and bottom. Did not hit her abdomen at any point. Denies LOF, VB, FM. Endorses very mild occasional cramps 1/10 which she states if she did not fall, she would not have noticed. Denies urinary symptoms, denies abn discharge.    Ante: Spontaneous pregnancy. NIPT and anatomy scan wnl. Passed GCT. Denies elevated BP in pregnancy.  EFW unk   GBS unk     OBHX:  G1 - current   GynHX: denies fibroids, ovarian cysts, abnormal pap smear, STI/herpes.   MedHX: denies  Surghx: denies  Medications: wellbutrin 150 mg (takes for "focus")  Allergies: NKDA    Physical Exam:  T(C): 36.5 (04-06-25 @ 15:06), Max: 36.5 (04--25 @ 15:06)  HR: 80 (04-06-25 @ 15:06) (80 - 80)  BP: 111/65 (-06-25 @ 15:06) (111/65 - 111/65)  RR: 18 (-06-25 @ 15:06) (18 - 18)  SpO2: 98% (--25 @ 15:06) (98% - 98%)    General: NAD  Pulm: no increased WOB  Abdomen: soft, gravid, nontender  Extremities: wnl   TAUS: Breech. Rigorous fetal movement. LUCI 10.   TVUS: CL 4.5 cm     EFM: 140 bpm, mod variability, + accels, - decels; reactive and reassuring  [place]: no ctx     A/p:   34y  at 27w4d presenting after a fall yesterday at 1630.    - Fetal status reassuring given appropriate modified BPP and reactive and reassuring NST. We discussed that typically after a non-direct fall we have prolonged monitoring for 4 hours, however she is >12 hours past her fall and the fetal status is reassuring. She does not have any other symptoms at this time other than mild occasional cramps that the patient does not even know if they are truly cramps. Uterine activity is not caught on toco and her CL is long. At this time, feel reassured in fetal status and patient can be discharged home.  - UA/Cx was sent for evaluation of possible uterine cramping; UA neg, will f/u UCx.   - Discharged with strict return precautions    Brianda Layne, PGY1  Discussed with Dr. Gomez PGY4 and Dr. Pierson attending

## 2025-04-06 NOTE — OB RN TRIAGE NOTE - NS_TRIAGEMEDICALSCREENINGPERFORMEDBY_OBGYN_ALL_OB_FT
Visit Date: 11/21/2023 - [facility]  Haven Barber  1949  female 74 y.o.      Subjective:    CC: Patient presents with no current complaints. She states she is feeling much better and has no problems with her breathing.   This is an initial nursing home visit.     HPI: Patient is a 74 year old lady who was admitted to [facility] after she presented with progressive fatigue. Apparently she had a fall earlier that day. Her workup in the ER showed that she has RSV infection. 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 She also has a history of early liver cirrhosis contributing to her anemia. Her hemoglobin is between 7 and 8 for the most part. Her hemoglobin this admission was 6.7. She was placed on IV Protonix. She, earlier this year, was in [facility] in July and August for upper and lower scopes. Patient was admitted to the hospital with sepsis secondary to RSV pneumonia, acute on chronic anemia with recurrent intermittent occult bleeding secondary to her gastric enteral vascular ectasia. She had EGD done August 2023 at [facility] s/p photocoagulation of her bleeds. She also had multiple contusions secondary to her fall without fractures. She was discharged to rehab facility for PT/OT.  Patient is seen and examined. Medications on chart reviewed. Labs are reviewed. Discussed with nursing staff.       ROS:  Review of Systems   Constitutional:  Negative for chills and fever.   Respiratory:  Negative for shortness of breath.    Cardiovascular:  Negative for chest pain.   Gastrointestinal:  Negative for abdominal pain, diarrhea, nausea and vomiting.   Genitourinary:  Negative for dysuria and frequency.   Musculoskeletal:  Positive for gait problem.   Neurological:  Positive for weakness. Negative for dizziness, light-headedness and headaches.        Current Meds: Refer to nursing home  Brianda Layne

## 2025-04-07 LAB
CULTURE RESULTS: SIGNIFICANT CHANGE UP
SPECIMEN SOURCE: SIGNIFICANT CHANGE UP

## 2025-04-08 DIAGNOSIS — Z3A.27 27 WEEKS GESTATION OF PREGNANCY: ICD-10-CM
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
## 2025-04-08 DIAGNOSIS — O26.892 OTHER SPECIFIED PREGNANCY RELATED CONDITIONS, SECOND TRIMESTER: ICD-10-CM
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
## 2025-04-15 ENCOUNTER — NON-APPOINTMENT (OUTPATIENT)
Age: 35
End: 2025-04-15

## 2025-04-15 ENCOUNTER — APPOINTMENT (OUTPATIENT)
Dept: OBGYN | Facility: CLINIC | Age: 35
End: 2025-04-15
Payer: COMMERCIAL

## 2025-04-15 VITALS
HEART RATE: 76 BPM | SYSTOLIC BLOOD PRESSURE: 111 MMHG | BODY MASS INDEX: 31.41 KG/M2 | OXYGEN SATURATION: 96 % | WEIGHT: 183 LBS | DIASTOLIC BLOOD PRESSURE: 74 MMHG

## 2025-04-15 PROCEDURE — 0502F SUBSEQUENT PRENATAL CARE: CPT

## 2025-04-15 PROCEDURE — 90715 TDAP VACCINE 7 YRS/> IM: CPT

## 2025-04-15 PROCEDURE — 90471 IMMUNIZATION ADMIN: CPT

## 2025-04-21 ENCOUNTER — TRANSCRIPTION ENCOUNTER (OUTPATIENT)
Age: 35
End: 2025-04-21

## 2025-04-22 ENCOUNTER — ASOB RESULT (OUTPATIENT)
Age: 35
End: 2025-04-22

## 2025-04-22 ENCOUNTER — APPOINTMENT (OUTPATIENT)
Dept: ANTEPARTUM | Facility: CLINIC | Age: 35
End: 2025-04-22
Payer: COMMERCIAL

## 2025-04-22 PROCEDURE — 76819 FETAL BIOPHYS PROFIL W/O NST: CPT | Mod: 59

## 2025-04-22 PROCEDURE — 76816 OB US FOLLOW-UP PER FETUS: CPT

## 2025-04-22 PROCEDURE — 76820 UMBILICAL ARTERY ECHO: CPT | Mod: 59

## 2025-04-22 PROCEDURE — 76817 TRANSVAGINAL US OBSTETRIC: CPT

## 2025-05-05 ENCOUNTER — APPOINTMENT (OUTPATIENT)
Dept: OBGYN | Facility: CLINIC | Age: 35
End: 2025-05-05
Payer: COMMERCIAL

## 2025-05-05 VITALS
DIASTOLIC BLOOD PRESSURE: 72 MMHG | BODY MASS INDEX: 32.27 KG/M2 | SYSTOLIC BLOOD PRESSURE: 107 MMHG | OXYGEN SATURATION: 98 % | HEART RATE: 80 BPM | WEIGHT: 188 LBS

## 2025-05-05 DIAGNOSIS — Z34.93 ENCOUNTER FOR SUPERVISION OF NORMAL PREGNANCY, UNSPECIFIED, THIRD TRIMESTER: ICD-10-CM

## 2025-05-05 PROCEDURE — 81003 URINALYSIS AUTO W/O SCOPE: CPT | Mod: QW

## 2025-05-05 PROCEDURE — 0502F SUBSEQUENT PRENATAL CARE: CPT

## 2025-05-09 ENCOUNTER — APPOINTMENT (OUTPATIENT)
Dept: BREAST CENTER | Facility: CLINIC | Age: 35
End: 2025-05-09

## 2025-05-14 ENCOUNTER — NON-APPOINTMENT (OUTPATIENT)
Age: 35
End: 2025-05-14

## 2025-05-27 ENCOUNTER — APPOINTMENT (OUTPATIENT)
Dept: OBGYN | Facility: CLINIC | Age: 35
End: 2025-05-27
Payer: COMMERCIAL

## 2025-05-27 DIAGNOSIS — Z20.7 CONTACT WITH AND (SUSPECTED) EXPOSURE TO PEDICULOSIS, ACARIASIS AND OTHER INFESTATIONS: ICD-10-CM

## 2025-05-27 PROCEDURE — 0502F SUBSEQUENT PRENATAL CARE: CPT

## 2025-05-27 PROCEDURE — 36415 COLL VENOUS BLD VENIPUNCTURE: CPT

## 2025-05-28 ENCOUNTER — TRANSCRIPTION ENCOUNTER (OUTPATIENT)
Age: 35
End: 2025-05-28

## 2025-05-28 LAB
T GONDII AB SER-IMP: NEGATIVE
T GONDII AB SER-IMP: NEGATIVE
T GONDII IGG SER QL: <3 IU/ML
T GONDII IGM SER QL: <3 AU/ML

## 2025-06-03 ENCOUNTER — ASOB RESULT (OUTPATIENT)
Age: 35
End: 2025-06-03

## 2025-06-03 ENCOUNTER — APPOINTMENT (OUTPATIENT)
Dept: ANTEPARTUM | Facility: CLINIC | Age: 35
End: 2025-06-03
Payer: COMMERCIAL

## 2025-06-03 PROCEDURE — 76819 FETAL BIOPHYS PROFIL W/O NST: CPT | Mod: 59

## 2025-06-03 PROCEDURE — 76820 UMBILICAL ARTERY ECHO: CPT | Mod: 59

## 2025-06-03 PROCEDURE — 76816 OB US FOLLOW-UP PER FETUS: CPT

## 2025-06-10 ENCOUNTER — APPOINTMENT (OUTPATIENT)
Dept: OBGYN | Facility: CLINIC | Age: 35
End: 2025-06-10
Payer: COMMERCIAL

## 2025-06-10 VITALS
BODY MASS INDEX: 32.44 KG/M2 | DIASTOLIC BLOOD PRESSURE: 70 MMHG | HEIGHT: 64 IN | WEIGHT: 190 LBS | HEART RATE: 96 BPM | OXYGEN SATURATION: 97 % | SYSTOLIC BLOOD PRESSURE: 105 MMHG

## 2025-06-10 PROCEDURE — 36415 COLL VENOUS BLD VENIPUNCTURE: CPT

## 2025-06-10 PROCEDURE — 0502F SUBSEQUENT PRENATAL CARE: CPT

## 2025-06-12 ENCOUNTER — APPOINTMENT (OUTPATIENT)
Dept: OBGYN | Facility: CLINIC | Age: 35
End: 2025-06-12
Payer: COMMERCIAL

## 2025-06-12 ENCOUNTER — OUTPATIENT (OUTPATIENT)
Dept: OUTPATIENT SERVICES | Facility: HOSPITAL | Age: 35
LOS: 1 days | End: 2025-06-12
Payer: COMMERCIAL

## 2025-06-12 VITALS
RESPIRATION RATE: 18 BRPM | HEART RATE: 94 BPM | DIASTOLIC BLOOD PRESSURE: 75 MMHG | OXYGEN SATURATION: 99 % | SYSTOLIC BLOOD PRESSURE: 124 MMHG | TEMPERATURE: 98 F

## 2025-06-12 DIAGNOSIS — O26.899 OTHER SPECIFIED PREGNANCY RELATED CONDITIONS, UNSPECIFIED TRIMESTER: ICD-10-CM

## 2025-06-12 PROBLEM — Z87.898 HISTORY OF ITCHING: Status: ACTIVE | Noted: 2025-06-12

## 2025-06-12 LAB
ALBUMIN SERPL ELPH-MCNC: 3.5 G/DL — SIGNIFICANT CHANGE UP (ref 3.3–5)
ALP SERPL-CCNC: 139 U/L — HIGH (ref 40–120)
ALT FLD-CCNC: 16 U/L — SIGNIFICANT CHANGE UP (ref 10–45)
ANION GAP SERPL CALC-SCNC: 12 MMOL/L — SIGNIFICANT CHANGE UP (ref 5–17)
APTT BLD: 26.9 SEC — SIGNIFICANT CHANGE UP (ref 26.1–36.8)
AST SERPL-CCNC: 25 U/L — SIGNIFICANT CHANGE UP (ref 10–40)
BASOPHILS # BLD AUTO: 0.03 K/UL — SIGNIFICANT CHANGE UP (ref 0–0.2)
BASOPHILS NFR BLD AUTO: 0.3 % — SIGNIFICANT CHANGE UP (ref 0–2)
BILIRUB SERPL-MCNC: 0.4 MG/DL — SIGNIFICANT CHANGE UP (ref 0.2–1.2)
BUN SERPL-MCNC: 9 MG/DL — SIGNIFICANT CHANGE UP (ref 7–23)
CALCIUM SERPL-MCNC: 8.8 MG/DL — SIGNIFICANT CHANGE UP (ref 8.4–10.5)
CHLORIDE SERPL-SCNC: 104 MMOL/L — SIGNIFICANT CHANGE UP (ref 96–108)
CO2 SERPL-SCNC: 21 MMOL/L — LOW (ref 22–31)
CREAT ?TM UR-MCNC: 52 MG/DL — SIGNIFICANT CHANGE UP
CREAT SERPL-MCNC: 0.47 MG/DL — LOW (ref 0.5–1.3)
EGFR: 129 ML/MIN/1.73M2 — SIGNIFICANT CHANGE UP
EGFR: 129 ML/MIN/1.73M2 — SIGNIFICANT CHANGE UP
EOSINOPHIL # BLD AUTO: 0.05 K/UL — SIGNIFICANT CHANGE UP (ref 0–0.5)
EOSINOPHIL NFR BLD AUTO: 0.5 % — SIGNIFICANT CHANGE UP (ref 0–6)
FIBRINOGEN PPP-MCNC: 397 MG/DL — SIGNIFICANT CHANGE UP (ref 200–445)
GLUCOSE SERPL-MCNC: 89 MG/DL — SIGNIFICANT CHANGE UP (ref 70–99)
HCT VFR BLD CALC: 32.7 % — LOW (ref 34.5–45)
HGB BLD-MCNC: 11.8 G/DL — SIGNIFICANT CHANGE UP (ref 11.5–15.5)
IMM GRANULOCYTES NFR BLD AUTO: 1.7 % — HIGH (ref 0–0.9)
INR BLD: 1.01 — SIGNIFICANT CHANGE UP (ref 0.85–1.16)
LDH SERPL L TO P-CCNC: 223 U/L — SIGNIFICANT CHANGE UP (ref 50–242)
LYMPHOCYTES # BLD AUTO: 2.34 K/UL — SIGNIFICANT CHANGE UP (ref 1–3.3)
LYMPHOCYTES # BLD AUTO: 24.2 % — SIGNIFICANT CHANGE UP (ref 13–44)
MCHC RBC-ENTMCNC: 32.7 PG — SIGNIFICANT CHANGE UP (ref 27–34)
MCHC RBC-ENTMCNC: 36.1 G/DL — HIGH (ref 32–36)
MCV RBC AUTO: 90.6 FL — SIGNIFICANT CHANGE UP (ref 80–100)
MONOCYTES # BLD AUTO: 1.2 K/UL — HIGH (ref 0–0.9)
MONOCYTES NFR BLD AUTO: 12.4 % — SIGNIFICANT CHANGE UP (ref 2–14)
NEUTROPHILS # BLD AUTO: 5.89 K/UL — SIGNIFICANT CHANGE UP (ref 1.8–7.4)
NEUTROPHILS NFR BLD AUTO: 60.9 % — SIGNIFICANT CHANGE UP (ref 43–77)
NRBC BLD AUTO-RTO: 0 /100 WBCS — SIGNIFICANT CHANGE UP (ref 0–0)
PLATELET # BLD AUTO: 243 K/UL — SIGNIFICANT CHANGE UP (ref 150–400)
POTASSIUM SERPL-MCNC: 3.6 MMOL/L — SIGNIFICANT CHANGE UP (ref 3.5–5.3)
POTASSIUM SERPL-SCNC: 3.6 MMOL/L — SIGNIFICANT CHANGE UP (ref 3.5–5.3)
PROT ?TM UR-MCNC: 5 MG/DL — SIGNIFICANT CHANGE UP (ref 0–12)
PROT SERPL-MCNC: 6.2 G/DL — SIGNIFICANT CHANGE UP (ref 6–8.3)
PROT/CREAT UR-RTO: 0.1 RATIO — SIGNIFICANT CHANGE UP (ref 0–0.2)
PROTHROM AB SERPL-ACNC: 11.6 SEC — SIGNIFICANT CHANGE UP (ref 9.9–13.4)
RBC # BLD: 3.61 M/UL — LOW (ref 3.8–5.2)
RBC # FLD: 13 % — SIGNIFICANT CHANGE UP (ref 10.3–14.5)
SODIUM SERPL-SCNC: 137 MMOL/L — SIGNIFICANT CHANGE UP (ref 135–145)
URATE SERPL-MCNC: 3.7 MG/DL — SIGNIFICANT CHANGE UP (ref 2.5–7)
WBC # BLD: 9.67 K/UL — SIGNIFICANT CHANGE UP (ref 3.8–10.5)
WBC # FLD AUTO: 9.67 K/UL — SIGNIFICANT CHANGE UP (ref 3.8–10.5)

## 2025-06-12 PROCEDURE — 36415 COLL VENOUS BLD VENIPUNCTURE: CPT

## 2025-06-12 PROCEDURE — 76815 OB US LIMITED FETUS(S): CPT

## 2025-06-12 PROCEDURE — 85730 THROMBOPLASTIN TIME PARTIAL: CPT

## 2025-06-12 PROCEDURE — 99214 OFFICE O/P EST MOD 30 MIN: CPT

## 2025-06-12 PROCEDURE — 85025 COMPLETE CBC W/AUTO DIFF WBC: CPT

## 2025-06-12 PROCEDURE — 59025 FETAL NON-STRESS TEST: CPT

## 2025-06-12 PROCEDURE — 84156 ASSAY OF PROTEIN URINE: CPT

## 2025-06-12 PROCEDURE — 83615 LACTATE (LD) (LDH) ENZYME: CPT

## 2025-06-12 PROCEDURE — 82570 ASSAY OF URINE CREATININE: CPT

## 2025-06-12 PROCEDURE — 80053 COMPREHEN METABOLIC PANEL: CPT

## 2025-06-12 PROCEDURE — 85384 FIBRINOGEN ACTIVITY: CPT

## 2025-06-12 PROCEDURE — 84550 ASSAY OF BLOOD/URIC ACID: CPT

## 2025-06-12 PROCEDURE — 85610 PROTHROMBIN TIME: CPT

## 2025-06-12 PROCEDURE — 76818 FETAL BIOPHYS PROFILE W/NST: CPT

## 2025-06-12 NOTE — OB RN TRIAGE NOTE - NS_GBS_INFANT_INVASIVE_OBGYN_ALL_OB_FT
Mildred Grissom RN, BSN, [facility] [address] [place] [address] N/A

## 2025-06-12 NOTE — OB PROVIDER TRIAGE NOTE - NS_OBACUITYLEVEL_OBGYN_ALL_OB
Spoke with Karen at [facility] for CT of abd/pelvis with contrast #[id number] jamel 12/12/2018-01/10/2019.
4

## 2025-06-12 NOTE — OB PROVIDER TRIAGE NOTE - HISTORY OF PRESENT ILLNESS
Patient is a 35 y.o.  @37w1d who presents with itchiness for the past several months. Patient states she has had generalized intermittent itching since February. States the itching is sometimes not present. States it is distributed all of over her body. Denies any localization to palms/soles of feet. States she notices the itching when she is relaxing on the couch in the evening and not distracted by other things. States the itching has improved in the past with the use of lotion. Denies any headaches, visual changes, chest pain, SOB, RUQ/epigastric pain.  Denies ctx, LOF, vaginal bleeding. Endorses fetal movement.     Ante: Spontaneous pregnancy. NIPT and anatomy scan wnl. Passed GCT. Denies elevated BP in pregnancy. EFW 3000g. GBS negative.   BH 6/3/25: cephalic, anterior placenta, LUCI 10.12, BPP 8/8, EFW 2596g 30%ile, AC 45%ile, low lying placenta, resolved.     OBHX:  G1 - current   GynHX: +remote hx of abn pap, subsequent wnl. denies fibroids, ovarian cysts, STI/herpes.   MedHX: denies  Surghx: denies  Medications: wellbutrin 150mg  Allergies: NKDA    Physical Exam  BP  124/75 (25 @ 16:15)  HR 94  Gen: Well-appearing. NAD.  Pulm: CTABL  CV: RRR, no m/r/g  Abd: Gravid uterus. Soft, non-tender, non-distended.  Ext: brachioradialis reflexes 2+ b/l; no LE edema    VE: closed/long/posterior    TAUS: cephalic presentation. posterior placenta. BPP 8/8, LUCI 13cm.  Ultrasound attached in chart.    FHT: Baseline 145, moderate variability, +accels, -decels  [place]: no ctx on toco    Labs:  W 9.7   H 11.8    P 243   Fib 397  Cr 0.47   A/A 25/16   UrA 3.7     P/C 0.1    A/P  35 y.o.  @37w1d presenting with generalized intermittent itchiness.  - No concern for ICP at this time given description of symptoms. Patient's symptoms improve with use of lotion. Likely 2/2 dry skin. Full labs wnl. Bile acids sent and received.  - Maternal status overall reassuring given patient clinically and hemodynamically stable  - Fetal status reassuring given NST reactive and BPP   - Patient counseled on strict return precautions    Monique Otero, PGY1  Discussed with Dr. Nunez and Dr. Cordova, PGY3

## 2025-06-14 ENCOUNTER — TRANSCRIPTION ENCOUNTER (OUTPATIENT)
Age: 35
End: 2025-06-14

## 2025-06-14 LAB
B-HEM STREP SPEC QL CULT: NORMAL
BASOPHILS # BLD AUTO: 0.04 K/UL
BASOPHILS NFR BLD AUTO: 0.4 %
EOSINOPHIL # BLD AUTO: 0.06 K/UL
EOSINOPHIL NFR BLD AUTO: 0.6 %
HBV SURFACE AG SER QL: NONREACTIVE
HCT VFR BLD CALC: 38.7 %
HCV AB SER QL: NONREACTIVE
HCV S/CO RATIO: 0.1 S/CO
HGB BLD-MCNC: 12.3 G/DL
HIV1+2 AB SPEC QL IA.RAPID: NONREACTIVE
IMM GRANULOCYTES NFR BLD AUTO: 1.8 %
LYMPHOCYTES # BLD AUTO: 2.12 K/UL
LYMPHOCYTES NFR BLD AUTO: 20.8 %
MAN DIFF?: NORMAL
MCHC RBC-ENTMCNC: 31.8 G/DL
MCHC RBC-ENTMCNC: 32 PG
MCV RBC AUTO: 100.8 FL
MONOCYTES # BLD AUTO: 1.06 K/UL
MONOCYTES NFR BLD AUTO: 10.4 %
NEUTROPHILS # BLD AUTO: 6.74 K/UL
NEUTROPHILS NFR BLD AUTO: 66 %
PLATELET # BLD AUTO: 280 K/UL
RBC # BLD: 3.84 M/UL
RBC # FLD: 14 %
T PALLIDUM AB SER QL IA: NEGATIVE
WBC # FLD AUTO: 10.2 K/UL

## 2025-06-15 LAB — BILE AC SER-MCNC: 1.4 UMOL/L

## 2025-06-17 ENCOUNTER — APPOINTMENT (OUTPATIENT)
Dept: OBGYN | Facility: CLINIC | Age: 35
End: 2025-06-17

## 2025-06-17 VITALS
BODY MASS INDEX: 32.61 KG/M2 | OXYGEN SATURATION: 98 % | HEART RATE: 96 BPM | WEIGHT: 191 LBS | DIASTOLIC BLOOD PRESSURE: 71 MMHG | SYSTOLIC BLOOD PRESSURE: 109 MMHG | HEIGHT: 64 IN

## 2025-06-19 ENCOUNTER — APPOINTMENT (OUTPATIENT)
Dept: ANTEPARTUM | Facility: CLINIC | Age: 35
End: 2025-06-19
Payer: COMMERCIAL

## 2025-06-19 ENCOUNTER — ASOB RESULT (OUTPATIENT)
Age: 35
End: 2025-06-19

## 2025-06-19 PROCEDURE — 76816 OB US FOLLOW-UP PER FETUS: CPT

## 2025-06-19 PROCEDURE — 76819 FETAL BIOPHYS PROFIL W/O NST: CPT | Mod: 59

## 2025-06-19 PROCEDURE — 76820 UMBILICAL ARTERY ECHO: CPT | Mod: 59

## 2025-06-24 ENCOUNTER — APPOINTMENT (OUTPATIENT)
Dept: OBGYN | Facility: CLINIC | Age: 35
End: 2025-06-24
Payer: COMMERCIAL

## 2025-06-24 VITALS
HEART RATE: 93 BPM | WEIGHT: 194 LBS | DIASTOLIC BLOOD PRESSURE: 82 MMHG | SYSTOLIC BLOOD PRESSURE: 125 MMHG | OXYGEN SATURATION: 98 % | HEIGHT: 64 IN | BODY MASS INDEX: 33.12 KG/M2

## 2025-06-24 PROCEDURE — 0502F SUBSEQUENT PRENATAL CARE: CPT

## 2025-06-25 ENCOUNTER — OUTPATIENT (OUTPATIENT)
Dept: OUTPATIENT SERVICES | Facility: HOSPITAL | Age: 35
LOS: 1 days | End: 2025-06-25
Payer: COMMERCIAL

## 2025-06-25 VITALS
DIASTOLIC BLOOD PRESSURE: 65 MMHG | OXYGEN SATURATION: 97 % | RESPIRATION RATE: 16 BRPM | HEART RATE: 90 BPM | SYSTOLIC BLOOD PRESSURE: 115 MMHG | TEMPERATURE: 98 F

## 2025-06-25 DIAGNOSIS — O26.899 OTHER SPECIFIED PREGNANCY RELATED CONDITIONS, UNSPECIFIED TRIMESTER: ICD-10-CM

## 2025-06-25 DIAGNOSIS — Z90.89 ACQUIRED ABSENCE OF OTHER ORGANS: Chronic | ICD-10-CM

## 2025-06-25 DIAGNOSIS — Z98.890 OTHER SPECIFIED POSTPROCEDURAL STATES: Chronic | ICD-10-CM

## 2025-06-25 PROCEDURE — 99214 OFFICE O/P EST MOD 30 MIN: CPT

## 2025-06-25 NOTE — OB RN TRIAGE NOTE - FALL HARM RISK - HARM RISK INTERVENTIONS

## 2025-06-25 NOTE — OB PROVIDER TRIAGE NOTE - HISTORY OF PRESENT ILLNESS
Patient is a  35y  at 39w0d presenting for painful contractions 5/10 every 30 mins. Denies LOF, VB, CTX. Endorses FM .     Ante: Spontaneous pregnancy. NIPT and anatomy scan wnl. Passed GCT. Denies elevated BP in pregnancy. EFW 3200g. GBS negative.        OBHX:  G1 - current   GynHX: +remote hx of abn pap, subsequent wnl. denies fibroids, ovarian cysts, STI/herpes.   MedHX: denies  Surghx: denies  Medications: wellbutrin 150mg  Allergies: NKDA    Physical Exam:  T(C): 36.8 (25 @ 16:43), Max: 36.8 (25 @ 16:43)  HR: 90 (25 @ 16:43) (90 - 90)  BP: 115/65 (25 @ 16:43) (115/65 - 115/65)  RR: 16 (25 @ 16:43) (16 - 16)  SpO2: 97% (25 @ 16:43) (97% - 97%)    General: NAD  Pulm: no increased WOB  Abdomen: soft, gravid, nontender  Extremities: wnl   TAUS: Cephalic. BPP 8/8. LUCI 15.6 cm - see attached to chart   VE: c/l    EFM: 140 bpm, mod variability, + accels, - decels; reactive and reassuring  [place]: no ctx        A/p:   35y  at 39w0d presenting for irregular contractions, not in labor at this time     - Fetal status is reassuring given BPP 8/8, appropriate LUCI, reactive and reassuring NST  - Not in labor given no CTX on monitor, c/l examination.   - Discharged with strict return precautions    Brianda Layne, PGY1  Discussed with Dr. Kramer and Dr. Erickson

## 2025-06-25 NOTE — OB PROVIDER TRIAGE NOTE - NS_LMP_OBGYN_ALL_OB_DT
25-Jun-2025 [Allergy Testing Dust Mite] : dust mites [Allergy Testing Mixed Feathers] : feathers [Allergy Testing Cockroach] : cockroach [Allergy Testing Dog] : dog [Allergy Testing Cat] : cat [] : molds [Allergy Testing Trees] : trees [Allergy Testing Weeds] : weeds [Allergy Testing Grasses] : grasses

## 2025-06-26 DIAGNOSIS — O47.1 FALSE LABOR AT OR AFTER 37 COMPLETED WEEKS OF GESTATION: ICD-10-CM

## 2025-06-26 DIAGNOSIS — O99.343 OTHER MENTAL DISORDERS COMPLICATING PREGNANCY, THIRD TRIMESTER: ICD-10-CM

## 2025-06-26 DIAGNOSIS — F90.9 ATTENTION-DEFICIT HYPERACTIVITY DISORDER, UNSPECIFIED TYPE: ICD-10-CM

## 2025-06-26 DIAGNOSIS — Z3A.39 39 WEEKS GESTATION OF PREGNANCY: ICD-10-CM

## 2025-07-01 ENCOUNTER — INPATIENT (INPATIENT)
Facility: HOSPITAL | Age: 35
LOS: 3 days | Discharge: HOME | End: 2025-07-05
Attending: OBSTETRICS & GYNECOLOGY | Admitting: OBSTETRICS & GYNECOLOGY
Payer: COMMERCIAL

## 2025-07-01 VITALS
RESPIRATION RATE: 16 BRPM | TEMPERATURE: 98 F | OXYGEN SATURATION: 99 % | SYSTOLIC BLOOD PRESSURE: 119 MMHG | HEIGHT: 64 IN | WEIGHT: 190.92 LBS | HEART RATE: 76 BPM | DIASTOLIC BLOOD PRESSURE: 65 MMHG

## 2025-07-01 DIAGNOSIS — Z90.89 ACQUIRED ABSENCE OF OTHER ORGANS: Chronic | ICD-10-CM

## 2025-07-01 DIAGNOSIS — Z98.890 OTHER SPECIFIED POSTPROCEDURAL STATES: Chronic | ICD-10-CM

## 2025-07-01 LAB
BASOPHILS # BLD AUTO: 0.02 K/UL — SIGNIFICANT CHANGE UP (ref 0–0.2)
BASOPHILS NFR BLD AUTO: 0.2 % — SIGNIFICANT CHANGE UP (ref 0–2)
EOSINOPHIL # BLD AUTO: 0.08 K/UL — SIGNIFICANT CHANGE UP (ref 0–0.5)
EOSINOPHIL NFR BLD AUTO: 0.8 % — SIGNIFICANT CHANGE UP (ref 0–6)
HCT VFR BLD CALC: 35.5 % — SIGNIFICANT CHANGE UP (ref 34.5–45)
HGB BLD-MCNC: 12.2 G/DL — SIGNIFICANT CHANGE UP (ref 11.5–15.5)
IMM GRANULOCYTES # BLD AUTO: 0.15 K/UL — HIGH (ref 0–0.07)
IMM GRANULOCYTES NFR BLD AUTO: 1.6 % — HIGH (ref 0–0.9)
LYMPHOCYTES # BLD AUTO: 2.45 K/UL — SIGNIFICANT CHANGE UP (ref 1–3.3)
LYMPHOCYTES NFR BLD AUTO: 25.7 % — SIGNIFICANT CHANGE UP (ref 13–44)
MCHC RBC-ENTMCNC: 32.1 PG — SIGNIFICANT CHANGE UP (ref 27–34)
MCHC RBC-ENTMCNC: 34.4 G/DL — SIGNIFICANT CHANGE UP (ref 32–36)
MCV RBC AUTO: 93.4 FL — SIGNIFICANT CHANGE UP (ref 80–100)
MONOCYTES # BLD AUTO: 1.18 K/UL — HIGH (ref 0–0.9)
MONOCYTES NFR BLD AUTO: 12.4 % — SIGNIFICANT CHANGE UP (ref 2–14)
NEUTROPHILS # BLD AUTO: 5.67 K/UL — SIGNIFICANT CHANGE UP (ref 1.8–7.4)
NEUTROPHILS NFR BLD AUTO: 59.3 % — SIGNIFICANT CHANGE UP (ref 43–77)
NRBC # BLD AUTO: 0 K/UL — SIGNIFICANT CHANGE UP (ref 0–0)
NRBC # FLD: 0 K/UL — SIGNIFICANT CHANGE UP (ref 0–0)
NRBC BLD AUTO-RTO: 0 /100 WBCS — SIGNIFICANT CHANGE UP (ref 0–0)
PLATELET # BLD AUTO: 285 K/UL — SIGNIFICANT CHANGE UP (ref 150–400)
PMV BLD: 9.3 FL — SIGNIFICANT CHANGE UP (ref 7–13)
RBC # BLD: 3.8 M/UL — SIGNIFICANT CHANGE UP (ref 3.8–5.2)
RBC # FLD: 12.8 % — SIGNIFICANT CHANGE UP (ref 10.3–14.5)
WBC # BLD: 9.55 K/UL — SIGNIFICANT CHANGE UP (ref 3.8–10.5)
WBC # FLD AUTO: 9.55 K/UL — SIGNIFICANT CHANGE UP (ref 3.8–10.5)

## 2025-07-01 RX ORDER — OXYTOCIN-SODIUM CHLORIDE 0.9% IV SOLN 30 UNIT/500ML 30-0.9/5 UT/ML-%
167 SOLUTION INTRAVENOUS
Qty: 30 | Refills: 0 | Status: DISCONTINUED | OUTPATIENT
Start: 2025-07-01 | End: 2025-07-03

## 2025-07-01 RX ORDER — CITRIC ACID/SODIUM CITRATE 300-500 MG
15 SOLUTION, ORAL ORAL EVERY 6 HOURS
Refills: 0 | Status: DISCONTINUED | OUTPATIENT
Start: 2025-07-01 | End: 2025-07-03

## 2025-07-01 RX ORDER — SODIUM CHLORIDE 9 G/1000ML
1000 INJECTION, SOLUTION INTRAVENOUS
Refills: 0 | Status: DISCONTINUED | OUTPATIENT
Start: 2025-07-01 | End: 2025-07-03

## 2025-07-02 LAB
BLD GP AB SCN SERPL QL: NEGATIVE — SIGNIFICANT CHANGE UP
RH IG SCN BLD-IMP: POSITIVE — SIGNIFICANT CHANGE UP
RH IG SCN BLD-IMP: POSITIVE — SIGNIFICANT CHANGE UP
T PALLIDUM AB TITR SER: NEGATIVE — SIGNIFICANT CHANGE UP

## 2025-07-02 PROCEDURE — 86901 BLOOD TYPING SEROLOGIC RH(D): CPT

## 2025-07-02 PROCEDURE — 36415 COLL VENOUS BLD VENIPUNCTURE: CPT

## 2025-07-02 PROCEDURE — 85025 COMPLETE CBC W/AUTO DIFF WBC: CPT

## 2025-07-02 PROCEDURE — 86900 BLOOD TYPING SEROLOGIC ABO: CPT

## 2025-07-02 PROCEDURE — 86850 RBC ANTIBODY SCREEN: CPT

## 2025-07-02 PROCEDURE — 86780 TREPONEMA PALLIDUM: CPT

## 2025-07-02 RX ORDER — PIPERACILLIN-TAZO-DEXTROSE,ISO 3.375G/5
4.5 IV SOLUTION, PIGGYBACK PREMIX FROZEN(ML) INTRAVENOUS ONCE
Refills: 0 | Status: COMPLETED | OUTPATIENT
Start: 2025-07-03 | End: 2025-07-03

## 2025-07-02 RX ORDER — ACETAMINOPHEN 500 MG/5ML
1000 LIQUID (ML) ORAL ONCE
Refills: 0 | Status: COMPLETED | OUTPATIENT
Start: 2025-07-02 | End: 2025-07-02

## 2025-07-02 RX ORDER — PIPERACILLIN-TAZO-DEXTROSE,ISO 3.375G/5
4.5 IV SOLUTION, PIGGYBACK PREMIX FROZEN(ML) INTRAVENOUS ONCE
Refills: 0 | Status: COMPLETED | OUTPATIENT
Start: 2025-07-02 | End: 2025-07-02

## 2025-07-02 RX ORDER — OXYTOCIN-SODIUM CHLORIDE 0.9% IV SOLN 30 UNIT/500ML 30-0.9/5 UT/ML-%
SOLUTION INTRAVENOUS
Qty: 30 | Refills: 0 | Status: DISCONTINUED | OUTPATIENT
Start: 2025-07-02 | End: 2025-07-03

## 2025-07-02 RX ORDER — FENTANYL/BUPIVACAINE/NS/PF 2MCG/ML-.1
250 PLASTIC BAG, INJECTION (ML) INJECTION
Refills: 0 | Status: DISCONTINUED | OUTPATIENT
Start: 2025-07-02 | End: 2025-07-02

## 2025-07-02 RX ORDER — PIPERACILLIN-TAZO-DEXTROSE,ISO 3.375G/5
4.5 IV SOLUTION, PIGGYBACK PREMIX FROZEN(ML) INTRAVENOUS ONCE
Refills: 0 | Status: DISCONTINUED | OUTPATIENT
Start: 2025-07-03 | End: 2025-07-03

## 2025-07-02 RX ORDER — PIPERACILLIN-TAZO-DEXTROSE,ISO 3.375G/5
4.5 IV SOLUTION, PIGGYBACK PREMIX FROZEN(ML) INTRAVENOUS EVERY 8 HOURS
Refills: 0 | Status: DISCONTINUED | OUTPATIENT
Start: 2025-07-03 | End: 2025-07-03

## 2025-07-02 RX ORDER — SODIUM CHLORIDE 9 G/1000ML
500 INJECTION, SOLUTION INTRAVENOUS ONCE
Refills: 0 | Status: DISCONTINUED | OUTPATIENT
Start: 2025-07-02 | End: 2025-07-05

## 2025-07-02 RX ADMIN — Medication 200 GRAM(S): at 22:54

## 2025-07-02 RX ADMIN — OXYTOCIN-SODIUM CHLORIDE 0.9% IV SOLN 30 UNIT/500ML 2 MILLIUNIT(S)/MIN: 30-0.9/5 SOLUTION at 13:10

## 2025-07-02 RX ADMIN — Medication 400 MILLIGRAM(S): at 23:46

## 2025-07-02 RX ADMIN — SODIUM CHLORIDE 125 MILLILITER(S): 9 INJECTION, SOLUTION INTRAVENOUS at 14:48

## 2025-07-03 PROCEDURE — 85025 COMPLETE CBC W/AUTO DIFF WBC: CPT

## 2025-07-03 PROCEDURE — 86900 BLOOD TYPING SEROLOGIC ABO: CPT

## 2025-07-03 PROCEDURE — 86850 RBC ANTIBODY SCREEN: CPT

## 2025-07-03 PROCEDURE — 86780 TREPONEMA PALLIDUM: CPT

## 2025-07-03 PROCEDURE — 36415 COLL VENOUS BLD VENIPUNCTURE: CPT

## 2025-07-03 PROCEDURE — 59400 OBSTETRICAL CARE: CPT

## 2025-07-03 PROCEDURE — 86901 BLOOD TYPING SEROLOGIC RH(D): CPT

## 2025-07-03 RX ORDER — IBUPROFEN 200 MG
600 TABLET ORAL EVERY 6 HOURS
Refills: 0 | Status: COMPLETED | OUTPATIENT
Start: 2025-07-03 | End: 2026-06-01

## 2025-07-03 RX ORDER — PRAMOXINE HCL 1 %
1 GEL (GRAM) TOPICAL EVERY 4 HOURS
Refills: 0 | Status: DISCONTINUED | OUTPATIENT
Start: 2025-07-03 | End: 2025-07-05

## 2025-07-03 RX ORDER — OXYTOCIN-SODIUM CHLORIDE 0.9% IV SOLN 30 UNIT/500ML 30-0.9/5 UT/ML-%
167 SOLUTION INTRAVENOUS
Qty: 30 | Refills: 0 | Status: DISCONTINUED | OUTPATIENT
Start: 2025-07-03 | End: 2025-07-05

## 2025-07-03 RX ORDER — BUPROPION HYDROBROMIDE 522 MG/1
150 TABLET, EXTENDED RELEASE ORAL EVERY 24 HOURS
Refills: 0 | Status: DISCONTINUED | OUTPATIENT
Start: 2025-07-03 | End: 2025-07-05

## 2025-07-03 RX ORDER — OXYCODONE HYDROCHLORIDE 30 MG/1
5 TABLET ORAL ONCE
Refills: 0 | Status: DISCONTINUED | OUTPATIENT
Start: 2025-07-03 | End: 2025-07-05

## 2025-07-03 RX ORDER — PRENATAL 136/IRON/FOLIC ACID 27 MG-1 MG
1 TABLET ORAL DAILY
Refills: 0 | Status: DISCONTINUED | OUTPATIENT
Start: 2025-07-03 | End: 2025-07-05

## 2025-07-03 RX ORDER — BENZOCAINE 220 MG/G
1 SPRAY, METERED PERIODONTAL EVERY 6 HOURS
Refills: 0 | Status: DISCONTINUED | OUTPATIENT
Start: 2025-07-03 | End: 2025-07-05

## 2025-07-03 RX ORDER — HYDROCORTISONE 10 MG/G
1 CREAM TOPICAL EVERY 6 HOURS
Refills: 0 | Status: DISCONTINUED | OUTPATIENT
Start: 2025-07-03 | End: 2025-07-05

## 2025-07-03 RX ORDER — KETOROLAC TROMETHAMINE 30 MG/ML
30 INJECTION, SOLUTION INTRAMUSCULAR; INTRAVENOUS ONCE
Refills: 0 | Status: DISCONTINUED | OUTPATIENT
Start: 2025-07-03 | End: 2025-07-03

## 2025-07-03 RX ORDER — DIBUCAINE 10 MG/G
1 OINTMENT TOPICAL EVERY 6 HOURS
Refills: 0 | Status: DISCONTINUED | OUTPATIENT
Start: 2025-07-03 | End: 2025-07-05

## 2025-07-03 RX ORDER — ACETAMINOPHEN 500 MG/5ML
975 LIQUID (ML) ORAL
Refills: 0 | Status: DISCONTINUED | OUTPATIENT
Start: 2025-07-03 | End: 2025-07-05

## 2025-07-03 RX ORDER — DIPHENHYDRAMINE HCL 12.5MG/5ML
25 ELIXIR ORAL EVERY 6 HOURS
Refills: 0 | Status: DISCONTINUED | OUTPATIENT
Start: 2025-07-03 | End: 2025-07-05

## 2025-07-03 RX ORDER — OXYCODONE HYDROCHLORIDE 30 MG/1
5 TABLET ORAL
Refills: 0 | Status: DISCONTINUED | OUTPATIENT
Start: 2025-07-03 | End: 2025-07-05

## 2025-07-03 RX ORDER — IBUPROFEN 200 MG
600 TABLET ORAL EVERY 6 HOURS
Refills: 0 | Status: DISCONTINUED | OUTPATIENT
Start: 2025-07-03 | End: 2025-07-05

## 2025-07-03 RX ORDER — MODIFIED LANOLIN 100 %
1 CREAM (GRAM) TOPICAL EVERY 6 HOURS
Refills: 0 | Status: DISCONTINUED | OUTPATIENT
Start: 2025-07-03 | End: 2025-07-05

## 2025-07-03 RX ORDER — MAGNESIUM HYDROXIDE 400 MG/5ML
30 SUSPENSION ORAL
Refills: 0 | Status: DISCONTINUED | OUTPATIENT
Start: 2025-07-03 | End: 2025-07-05

## 2025-07-03 RX ORDER — WITCH HAZEL LEAF
1 FLUID EXTRACT MISCELLANEOUS EVERY 4 HOURS
Refills: 0 | Status: DISCONTINUED | OUTPATIENT
Start: 2025-07-03 | End: 2025-07-05

## 2025-07-03 RX ORDER — SIMETHICONE 80 MG
80 TABLET,CHEWABLE ORAL EVERY 4 HOURS
Refills: 0 | Status: DISCONTINUED | OUTPATIENT
Start: 2025-07-03 | End: 2025-07-05

## 2025-07-03 RX ADMIN — Medication 3 MILLILITER(S): at 07:30

## 2025-07-03 RX ADMIN — Medication 975 MILLIGRAM(S): at 20:36

## 2025-07-03 RX ADMIN — Medication 600 MILLIGRAM(S): at 17:29

## 2025-07-03 RX ADMIN — KETOROLAC TROMETHAMINE 30 MILLIGRAM(S): 30 INJECTION, SOLUTION INTRAMUSCULAR; INTRAVENOUS at 04:45

## 2025-07-03 RX ADMIN — Medication 1 APPLICATION(S): at 07:32

## 2025-07-03 RX ADMIN — BENZOCAINE 1 SPRAY(S): 220 SPRAY, METERED PERIODONTAL at 07:32

## 2025-07-03 RX ADMIN — Medication 600 MILLIGRAM(S): at 23:25

## 2025-07-03 RX ADMIN — DIBUCAINE 1 APPLICATION(S): 10 OINTMENT TOPICAL at 07:32

## 2025-07-03 RX ADMIN — Medication 3 MILLILITER(S): at 22:00

## 2025-07-03 RX ADMIN — Medication 975 MILLIGRAM(S): at 14:37

## 2025-07-03 RX ADMIN — Medication 600 MILLIGRAM(S): at 11:37

## 2025-07-04 ENCOUNTER — TRANSCRIPTION ENCOUNTER (OUTPATIENT)
Age: 35
End: 2025-07-04

## 2025-07-04 RX ORDER — BUPROPION HYDROBROMIDE 522 MG/1
1 TABLET, EXTENDED RELEASE ORAL
Qty: 0 | Refills: 0 | DISCHARGE

## 2025-07-04 RX ORDER — IBUPROFEN 200 MG
1 TABLET ORAL
Qty: 0 | Refills: 0 | DISCHARGE
Start: 2025-07-04

## 2025-07-04 RX ORDER — ACETAMINOPHEN 500 MG/5ML
3 LIQUID (ML) ORAL
Qty: 0 | Refills: 0 | DISCHARGE
Start: 2025-07-04

## 2025-07-04 RX ORDER — PRENATAL 136/IRON/FOLIC ACID 27 MG-1 MG
1 TABLET ORAL
Qty: 0 | Refills: 0 | DISCHARGE
Start: 2025-07-04

## 2025-07-04 RX ADMIN — Medication 975 MILLIGRAM(S): at 08:19

## 2025-07-04 RX ADMIN — Medication 3 MILLILITER(S): at 07:54

## 2025-07-04 RX ADMIN — DIBUCAINE 1 APPLICATION(S): 10 OINTMENT TOPICAL at 06:17

## 2025-07-04 RX ADMIN — Medication 975 MILLIGRAM(S): at 03:36

## 2025-07-04 RX ADMIN — Medication 600 MILLIGRAM(S): at 06:16

## 2025-07-04 RX ADMIN — Medication 1 TABLET(S): at 12:29

## 2025-07-04 RX ADMIN — Medication 600 MILLIGRAM(S): at 18:40

## 2025-07-04 RX ADMIN — Medication 975 MILLIGRAM(S): at 20:53

## 2025-07-04 RX ADMIN — Medication 600 MILLIGRAM(S): at 12:28

## 2025-07-04 RX ADMIN — BUPROPION HYDROBROMIDE 150 MILLIGRAM(S): 522 TABLET, EXTENDED RELEASE ORAL at 06:17

## 2025-07-04 RX ADMIN — Medication 975 MILLIGRAM(S): at 15:16

## 2025-07-05 VITALS
RESPIRATION RATE: 18 BRPM | TEMPERATURE: 98 F | HEART RATE: 54 BPM | OXYGEN SATURATION: 97 % | DIASTOLIC BLOOD PRESSURE: 65 MMHG | SYSTOLIC BLOOD PRESSURE: 99 MMHG

## 2025-07-05 PROCEDURE — 85025 COMPLETE CBC W/AUTO DIFF WBC: CPT

## 2025-07-05 PROCEDURE — 86901 BLOOD TYPING SEROLOGIC RH(D): CPT

## 2025-07-05 PROCEDURE — 86780 TREPONEMA PALLIDUM: CPT

## 2025-07-05 PROCEDURE — 86900 BLOOD TYPING SEROLOGIC ABO: CPT

## 2025-07-05 PROCEDURE — 86850 RBC ANTIBODY SCREEN: CPT

## 2025-07-05 PROCEDURE — 36415 COLL VENOUS BLD VENIPUNCTURE: CPT

## 2025-07-05 RX ADMIN — Medication 975 MILLIGRAM(S): at 02:00

## 2025-07-05 RX ADMIN — Medication 600 MILLIGRAM(S): at 11:20

## 2025-07-05 RX ADMIN — Medication 975 MILLIGRAM(S): at 08:46

## 2025-07-05 RX ADMIN — BUPROPION HYDROBROMIDE 150 MILLIGRAM(S): 522 TABLET, EXTENDED RELEASE ORAL at 06:48

## 2025-07-05 RX ADMIN — Medication 600 MILLIGRAM(S): at 06:48

## 2025-07-05 RX ADMIN — Medication 3 MILLILITER(S): at 02:47

## 2025-07-09 DIAGNOSIS — Z28.09 IMMUNIZATION NOT CARRIED OUT BECAUSE OF OTHER CONTRAINDICATION: ICD-10-CM

## 2025-07-09 DIAGNOSIS — Z3A.40 40 WEEKS GESTATION OF PREGNANCY: ICD-10-CM

## 2025-07-09 DIAGNOSIS — O41.1230 CHORIOAMNIONITIS, THIRD TRIMESTER, NOT APPLICABLE OR UNSPECIFIED: ICD-10-CM

## 2025-07-10 ENCOUNTER — APPOINTMENT (OUTPATIENT)
Dept: OBGYN | Facility: CLINIC | Age: 35
End: 2025-07-10
Payer: COMMERCIAL

## 2025-07-10 VITALS — DIASTOLIC BLOOD PRESSURE: 91 MMHG | SYSTOLIC BLOOD PRESSURE: 132 MMHG | HEART RATE: 77 BPM | OXYGEN SATURATION: 98 %

## 2025-07-10 PROBLEM — F90.9 ATTENTION-DEFICIT HYPERACTIVITY DISORDER, UNSPECIFIED TYPE: Chronic | Status: ACTIVE | Noted: 2025-06-25

## 2025-07-10 PROCEDURE — 0503F POSTPARTUM CARE VISIT: CPT

## 2025-07-11 ENCOUNTER — NON-APPOINTMENT (OUTPATIENT)
Age: 35
End: 2025-07-11

## 2025-07-11 ENCOUNTER — TRANSCRIPTION ENCOUNTER (OUTPATIENT)
Age: 35
End: 2025-07-11

## 2025-07-11 LAB
ALBUMIN SERPL ELPH-MCNC: 4.1 G/DL
ALP BLD-CCNC: 132 U/L
ALT SERPL-CCNC: 43 U/L
ANION GAP SERPL CALC-SCNC: 11 MMOL/L
AST SERPL-CCNC: 35 U/L
BILIRUB SERPL-MCNC: 0.5 MG/DL
BUN SERPL-MCNC: 17 MG/DL
CALCIUM SERPL-MCNC: 9.4 MG/DL
CHLORIDE SERPL-SCNC: 107 MMOL/L
CO2 SERPL-SCNC: 23 MMOL/L
CREAT SERPL-MCNC: 0.69 MG/DL
EGFRCR SERPLBLD CKD-EPI 2021: 117 ML/MIN/1.73M2
GLUCOSE SERPL-MCNC: 84 MG/DL
HCT VFR BLD CALC: 41.1 %
HGB BLD-MCNC: 13.3 G/DL
LDH SERPL-CCNC: 289 U/L
MCHC RBC-ENTMCNC: 31.4 PG
MCHC RBC-ENTMCNC: 32.4 G/DL
MCV RBC AUTO: 97.2 FL
PLATELET # BLD AUTO: 398 K/UL
POTASSIUM SERPL-SCNC: 4.6 MMOL/L
PROT SERPL-MCNC: 6.9 G/DL
RBC # BLD: 4.23 M/UL
RBC # FLD: 13.2 %
SODIUM SERPL-SCNC: 141 MMOL/L
URATE SERPL-MCNC: 5.7 MG/DL
WBC # FLD AUTO: 6.95 K/UL

## 2025-07-23 ENCOUNTER — NON-APPOINTMENT (OUTPATIENT)
Age: 35
End: 2025-07-23

## 2025-07-23 PROBLEM — N64.59 ABNORMAL BREAST EXAM: Status: ACTIVE | Noted: 2025-07-23

## 2025-07-29 ENCOUNTER — APPOINTMENT (OUTPATIENT)
Dept: OBGYN | Facility: CLINIC | Age: 35
End: 2025-07-29

## 2025-07-29 VITALS
DIASTOLIC BLOOD PRESSURE: 80 MMHG | BODY MASS INDEX: 28.68 KG/M2 | HEART RATE: 94 BPM | WEIGHT: 168 LBS | OXYGEN SATURATION: 99 % | HEIGHT: 64 IN | SYSTOLIC BLOOD PRESSURE: 113 MMHG

## 2025-07-29 DIAGNOSIS — Z34.91 ENCOUNTER FOR SUPERVISION OF NORMAL PREGNANCY, UNSPECIFIED, FIRST TRIMESTER: ICD-10-CM

## 2025-07-29 DIAGNOSIS — Z34.02 ENCOUNTER FOR SUPERVISION OF NORMAL FIRST PREGNANCY, SECOND TRIMESTER: ICD-10-CM

## 2025-07-29 DIAGNOSIS — Z31.9 ENCOUNTER FOR PROCREATIVE MANAGEMENT, UNSPECIFIED: ICD-10-CM

## 2025-08-13 ENCOUNTER — APPOINTMENT (OUTPATIENT)
Dept: OBGYN | Facility: CLINIC | Age: 35
End: 2025-08-13

## 2025-08-13 ENCOUNTER — NON-APPOINTMENT (OUTPATIENT)
Age: 35
End: 2025-08-13

## 2025-08-13 VITALS
DIASTOLIC BLOOD PRESSURE: 78 MMHG | WEIGHT: 167 LBS | BODY MASS INDEX: 28.67 KG/M2 | SYSTOLIC BLOOD PRESSURE: 107 MMHG | HEART RATE: 67 BPM | OXYGEN SATURATION: 98 %

## 2025-08-13 DIAGNOSIS — N81.89 OTHER FEMALE GENITAL PROLAPSE: ICD-10-CM

## 2025-08-13 RX ORDER — BUPROPION HYDROCHLORIDE 300 MG/1
300 TABLET, EXTENDED RELEASE ORAL DAILY
Qty: 90 | Refills: 2 | Status: ACTIVE | COMMUNITY
Start: 2025-08-13 | End: 1900-01-01